# Patient Record
Sex: FEMALE | URBAN - METROPOLITAN AREA
[De-identification: names, ages, dates, MRNs, and addresses within clinical notes are randomized per-mention and may not be internally consistent; named-entity substitution may affect disease eponyms.]

---

## 2022-08-30 ENCOUNTER — APPOINTMENT (RX ONLY)
Dept: URBAN - METROPOLITAN AREA CLINIC 151 | Facility: CLINIC | Age: 32
Setting detail: DERMATOLOGY
End: 2022-08-30

## 2022-08-30 DIAGNOSIS — T88.7XX: ICD-10-CM

## 2022-08-30 PROBLEM — T88.7XXA UNSPECIFIED ADVERSE EFFECT OF DRUG OR MEDICAMENT, INITIAL ENCOUNTER: Status: ACTIVE | Noted: 2022-08-30

## 2022-08-30 PROCEDURE — ? PRESCRIPTION

## 2022-08-30 PROCEDURE — ? PRESCRIPTION MEDICATION MANAGEMENT

## 2022-08-30 PROCEDURE — ? DIAGNOSIS COMMENT

## 2022-08-30 PROCEDURE — 99203 OFFICE O/P NEW LOW 30 MIN: CPT

## 2022-08-30 PROCEDURE — ? COUNSELING

## 2022-08-30 RX ORDER — CLOBETASOL PROPIONATE 0.5 MG/G
CREAM TOPICAL BID
Qty: 60 | Refills: 0 | Status: ERX | COMMUNITY
Start: 2022-08-30

## 2022-08-30 RX ORDER — PREDNISONE 10 MG/1
TABLET ORAL
Qty: 24 | Refills: 0 | Status: ERX | COMMUNITY
Start: 2022-08-30

## 2022-08-30 RX ADMIN — PREDNISONE: 10 TABLET ORAL at 00:00

## 2022-08-30 RX ADMIN — CLOBETASOL PROPIONATE: 0.5 CREAM TOPICAL at 00:00

## 2022-08-30 NOTE — PROCEDURE: PRESCRIPTION MEDICATION MANAGEMENT
Continue Regimen: Zyrtec BID
Discontinue Regimen: Trianex (had at home)
Render In Strict Bullet Format?: No
Detail Level: Zone
Initiate Treatment: Prednisone 30mg taper x12 days\\nClobetasol cream BID x2 weeks\\nVanicream QD

## 2022-08-30 NOTE — PROCEDURE: DIAGNOSIS COMMENT
Render Risk Assessment In Note?: no
Detail Level: Simple
Comment: Vs ACD.\\nReassured pt that it is safe to take a Prednisone course & apply Clobetasol cream while breastfeeding, but to avoid skin contact with her baby.

## 2022-08-30 NOTE — HPI: RASH
What Type Of Note Output Would You Prefer (Optional)?: Bullet Format
Is This A New Presentation, Or A Follow-Up?: Rash
Additional History: - Pt is 12 weeks post partum, and currently breastfeeding. \\n- Rash started flaring everywhere 7-8 weeks ago, about 4 weeks after having first child. \\n- Also reports that she’s experienced a similar flare in which she was diagnosed with guttate PSO. \\n- Notes that she experiences joint pain in her elbows, where she also has rashes on.\\n- Pt’s mother has been diagnosed with plaque PSO & rheumatoid arthritis.\\n- Has tried & failed Trianex.

## 2022-09-16 ENCOUNTER — APPOINTMENT (RX ONLY)
Dept: URBAN - METROPOLITAN AREA CLINIC 151 | Facility: CLINIC | Age: 32
Setting detail: DERMATOLOGY
End: 2022-09-16

## 2022-09-16 DIAGNOSIS — T88.7XX: ICD-10-CM

## 2022-09-16 PROBLEM — T88.7XXA UNSPECIFIED ADVERSE EFFECT OF DRUG OR MEDICAMENT, INITIAL ENCOUNTER: Status: ACTIVE | Noted: 2022-09-16

## 2022-09-16 PROCEDURE — ? COUNSELING

## 2022-09-16 PROCEDURE — ? DIAGNOSIS COMMENT

## 2022-09-16 PROCEDURE — ? PRESCRIPTION MEDICATION MANAGEMENT

## 2022-09-16 PROCEDURE — 99213 OFFICE O/P EST LOW 20 MIN: CPT

## 2022-09-16 NOTE — PROCEDURE: PRESCRIPTION MEDICATION MANAGEMENT
Continue Regimen: Clobetasol cream BID PRN\\nZyrtec BID
Discontinue Regimen: Prednisone 30mg taper x12 days\\nTrianex (had at home)
Render In Strict Bullet Format?: No
Detail Level: Zone

## 2022-09-16 NOTE — PROCEDURE: DIAGNOSIS COMMENT
Render Risk Assessment In Note?: no
Detail Level: Simple
Comment: Pt started to reflare with hives  on 10mg of Prednisone taper, but is slowly improving on exam today. will treat more aggressively with topical steroids

## 2023-10-17 ENCOUNTER — APPOINTMENT (RX ONLY)
Dept: URBAN - METROPOLITAN AREA CLINIC 152 | Facility: CLINIC | Age: 33
Setting detail: DERMATOLOGY
End: 2023-10-17

## 2023-10-17 DIAGNOSIS — L20.89 OTHER ATOPIC DERMATITIS: ICD-10-CM

## 2023-10-17 PROCEDURE — ? COUNSELING

## 2023-10-17 PROCEDURE — ? PRESCRIPTION

## 2023-10-17 PROCEDURE — ? DIAGNOSIS COMMENT

## 2023-10-17 PROCEDURE — 99214 OFFICE O/P EST MOD 30 MIN: CPT

## 2023-10-17 RX ORDER — TACROLIMUS 1 MG/G
OINTMENT TOPICAL
Qty: 100 | Refills: 1 | Status: ERX | COMMUNITY
Start: 2023-10-17

## 2023-10-17 RX ORDER — TRIAMCINOLONE ACETONIDE 1 MG/G
CREAM TOPICAL BID
Qty: 45 | Refills: 3 | Status: ERX | COMMUNITY
Start: 2023-10-17

## 2023-10-17 RX ORDER — HYDROCORTISONE 25 MG/G
CREAM TOPICAL
Qty: 30 | Refills: 3 | Status: ERX | COMMUNITY
Start: 2023-10-17

## 2023-10-17 RX ADMIN — HYDROCORTISONE: 25 CREAM TOPICAL at 00:00

## 2023-10-17 RX ADMIN — TRIAMCINOLONE ACETONIDE: 1 CREAM TOPICAL at 00:00

## 2023-10-17 RX ADMIN — TACROLIMUS: 1 OINTMENT TOPICAL at 00:00

## 2023-10-17 NOTE — PROCEDURE: DIAGNOSIS COMMENT
Detail Level: Simple
Render Risk Assessment In Note?: no
Comment: Patient has been using triamcinolone, Enstilar, but doesn’t like using high dose steroids. She also was told swelling in her cheeks is consistent with rosacea and given soolantra and metronidazole. She has a hx environmental allergies and now well controlled asthma, off singulair. She has been told it was eczema, psoraisis, CTD in the past. Her area of discomfort now is mostly the eyelid. She uses all fragrance free products. Allergy testing in the past but never patch testing. Based on hx atopy and photos it seems patient has eczema with intermittent dermatographism (hives around the eczema from scratch). No recent dermatographism and hasn’t been taking antihistamines. She may also have a component of ACD. Recommend hydrocortisone 2.5% BID on the eyelids for 2 weeks then switching to tacrolimus BID for maintenance. For the body, recommend triamcinolone BID for flares then switching to tacrolimus BID. Recommed patch testing given involvement of labia. If not responding to topicals, consider biopsy - discussed option of biopsy today as patient is still not sure of her diagnosis, although I favor eczema, but patient deferred. Consider opzelura in the future.

## 2023-10-17 NOTE — HPI: RASH
How Severe Is Your Rash?: moderate
Is This A New Presentation, Or A Follow-Up?: Rash
Additional History: Patient states she’s currently using cerave, Vanicream and aquaphor. She has intermittent swelling and hives. Patient has been primary care physician, they told her it might be psoriasis. Pt has a hx of environmental allergies and allergic to cats.